# Patient Record
Sex: FEMALE | Race: BLACK OR AFRICAN AMERICAN | Employment: UNEMPLOYED | URBAN - METROPOLITAN AREA
[De-identification: names, ages, dates, MRNs, and addresses within clinical notes are randomized per-mention and may not be internally consistent; named-entity substitution may affect disease eponyms.]

---

## 2017-03-07 ENCOUNTER — HOSPITAL ENCOUNTER (OUTPATIENT)
Age: 57
Setting detail: OBSERVATION
Discharge: HOME | DRG: 460 | End: 2017-03-08
Attending: EMERGENCY MEDICINE | Admitting: INTERNAL MEDICINE
Payer: MEDICAID

## 2017-03-07 ENCOUNTER — APPOINTMENT (OUTPATIENT)
Dept: GENERAL RADIOLOGY | Age: 57
DRG: 460 | End: 2017-03-07
Attending: EMERGENCY MEDICINE
Payer: MEDICAID

## 2017-03-07 DIAGNOSIS — E86.0 DEHYDRATION: ICD-10-CM

## 2017-03-07 DIAGNOSIS — Z94.0 RENAL TRANSPLANT RECIPIENT: ICD-10-CM

## 2017-03-07 DIAGNOSIS — K52.9 GASTROENTERITIS, ACUTE: Primary | ICD-10-CM

## 2017-03-07 PROBLEM — K21.9 GASTROESOPHAGEAL REFLUX DISEASE WITHOUT ESOPHAGITIS: Status: ACTIVE | Noted: 2017-03-07

## 2017-03-07 PROBLEM — N18.30 DM TYPE 2 CAUSING CKD STAGE 3 (HCC): Status: ACTIVE | Noted: 2017-03-07

## 2017-03-07 PROBLEM — E11.22 DM TYPE 2 CAUSING CKD STAGE 3 (HCC): Status: ACTIVE | Noted: 2017-03-07

## 2017-03-07 PROBLEM — F32.A DEPRESSION: Status: ACTIVE | Noted: 2017-03-07

## 2017-03-07 PROBLEM — E78.5 HYPERLIPIDEMIA: Status: ACTIVE | Noted: 2017-03-07

## 2017-03-07 LAB
ALBUMIN SERPL BCP-MCNC: 3.1 G/DL (ref 3.5–5)
ALBUMIN/GLOB SERPL: 0.8 {RATIO} (ref 1.1–2.2)
ALP SERPL-CCNC: 79 U/L (ref 45–117)
ALT SERPL-CCNC: 48 U/L (ref 12–78)
ANION GAP BLD CALC-SCNC: 14 MMOL/L (ref 5–15)
APPEARANCE UR: ABNORMAL
AST SERPL W P-5'-P-CCNC: 23 U/L (ref 15–37)
BACTERIA URNS QL MICRO: ABNORMAL /HPF
BASOPHILS # BLD AUTO: 0 K/UL (ref 0–0.1)
BASOPHILS # BLD: 0 % (ref 0–1)
BILIRUB SERPL-MCNC: 0.5 MG/DL (ref 0.2–1)
BILIRUB UR QL CFM: NEGATIVE
BUN SERPL-MCNC: 22 MG/DL (ref 6–20)
BUN/CREAT SERPL: 12 (ref 12–20)
CALCIUM SERPL-MCNC: 8.8 MG/DL (ref 8.5–10.1)
CHLORIDE SERPL-SCNC: 107 MMOL/L (ref 97–108)
CO2 SERPL-SCNC: 19 MMOL/L (ref 21–32)
COLOR UR: ABNORMAL
CREAT SERPL-MCNC: 1.79 MG/DL (ref 0.55–1.02)
EOSINOPHIL # BLD: 0.3 K/UL (ref 0–0.4)
EOSINOPHIL NFR BLD: 3 % (ref 0–7)
EPITH CASTS URNS QL MICRO: ABNORMAL /LPF
ERYTHROCYTE [DISTWIDTH] IN BLOOD BY AUTOMATED COUNT: 13.5 % (ref 11.5–14.5)
GLOBULIN SER CALC-MCNC: 3.7 G/DL (ref 2–4)
GLUCOSE BLD STRIP.AUTO-MCNC: 184 MG/DL (ref 65–100)
GLUCOSE BLD STRIP.AUTO-MCNC: 230 MG/DL (ref 65–100)
GLUCOSE BLD STRIP.AUTO-MCNC: 336 MG/DL (ref 65–100)
GLUCOSE BLD STRIP.AUTO-MCNC: 378 MG/DL (ref 65–100)
GLUCOSE SERPL-MCNC: 226 MG/DL (ref 65–100)
GLUCOSE UR STRIP.AUTO-MCNC: NEGATIVE MG/DL
HCT VFR BLD AUTO: 37.9 % (ref 35–47)
HGB BLD-MCNC: 12.7 G/DL (ref 11.5–16)
HGB UR QL STRIP: ABNORMAL
KETONES UR QL STRIP.AUTO: ABNORMAL MG/DL
LACTATE SERPL-SCNC: 1.1 MMOL/L (ref 0.4–2)
LEUKOCYTE ESTERASE UR QL STRIP.AUTO: NEGATIVE
LIPASE SERPL-CCNC: 156 U/L (ref 73–393)
LYMPHOCYTES # BLD AUTO: 34 % (ref 12–49)
LYMPHOCYTES # BLD: 3 K/UL (ref 0.8–3.5)
MCH RBC QN AUTO: 29.5 PG (ref 26–34)
MCHC RBC AUTO-ENTMCNC: 33.5 G/DL (ref 30–36.5)
MCV RBC AUTO: 88.1 FL (ref 80–99)
MONOCYTES # BLD: 1.3 K/UL (ref 0–1)
MONOCYTES NFR BLD AUTO: 15 % (ref 5–13)
NEUTS SEG # BLD: 4.3 K/UL (ref 1.8–8)
NEUTS SEG NFR BLD AUTO: 48 % (ref 32–75)
NITRITE UR QL STRIP.AUTO: NEGATIVE
PH UR STRIP: 5.5 [PH] (ref 5–8)
PLATELET # BLD AUTO: 137 K/UL (ref 150–400)
POTASSIUM SERPL-SCNC: 3.7 MMOL/L (ref 3.5–5.1)
PROT SERPL-MCNC: 6.8 G/DL (ref 6.4–8.2)
PROT UR STRIP-MCNC: 100 MG/DL
RBC # BLD AUTO: 4.3 M/UL (ref 3.8–5.2)
RBC #/AREA URNS HPF: ABNORMAL /HPF (ref 0–5)
RBC MORPH BLD: ABNORMAL
SERVICE CMNT-IMP: ABNORMAL
SODIUM SERPL-SCNC: 140 MMOL/L (ref 136–145)
SP GR UR REFRACTOMETRY: 1.02 (ref 1–1.03)
UA: UC IF INDICATED,UAUC: ABNORMAL
UROBILINOGEN UR QL STRIP.AUTO: 0.2 EU/DL (ref 0.2–1)
WBC # BLD AUTO: 8.9 K/UL (ref 3.6–11)
WBC URNS QL MICRO: ABNORMAL /HPF (ref 0–4)

## 2017-03-07 PROCEDURE — 96375 TX/PRO/DX INJ NEW DRUG ADDON: CPT

## 2017-03-07 PROCEDURE — 74011250637 HC RX REV CODE- 250/637: Performed by: INTERNAL MEDICINE

## 2017-03-07 PROCEDURE — 85025 COMPLETE CBC W/AUTO DIFF WBC: CPT | Performed by: EMERGENCY MEDICINE

## 2017-03-07 PROCEDURE — 36415 COLL VENOUS BLD VENIPUNCTURE: CPT | Performed by: EMERGENCY MEDICINE

## 2017-03-07 PROCEDURE — 80053 COMPREHEN METABOLIC PANEL: CPT | Performed by: EMERGENCY MEDICINE

## 2017-03-07 PROCEDURE — 81001 URINALYSIS AUTO W/SCOPE: CPT | Performed by: EMERGENCY MEDICINE

## 2017-03-07 PROCEDURE — 74011250636 HC RX REV CODE- 250/636: Performed by: INTERNAL MEDICINE

## 2017-03-07 PROCEDURE — 65270000029 HC RM PRIVATE

## 2017-03-07 PROCEDURE — 96374 THER/PROPH/DIAG INJ IV PUSH: CPT

## 2017-03-07 PROCEDURE — 82962 GLUCOSE BLOOD TEST: CPT

## 2017-03-07 PROCEDURE — 74011250637 HC RX REV CODE- 250/637: Performed by: EMERGENCY MEDICINE

## 2017-03-07 PROCEDURE — 74011636637 HC RX REV CODE- 636/637: Performed by: INTERNAL MEDICINE

## 2017-03-07 PROCEDURE — 96361 HYDRATE IV INFUSION ADD-ON: CPT

## 2017-03-07 PROCEDURE — 87086 URINE CULTURE/COLONY COUNT: CPT | Performed by: EMERGENCY MEDICINE

## 2017-03-07 PROCEDURE — 99285 EMERGENCY DEPT VISIT HI MDM: CPT

## 2017-03-07 PROCEDURE — 71020 XR CHEST PA LAT: CPT

## 2017-03-07 PROCEDURE — 74011250636 HC RX REV CODE- 250/636: Performed by: EMERGENCY MEDICINE

## 2017-03-07 PROCEDURE — 83605 ASSAY OF LACTIC ACID: CPT | Performed by: EMERGENCY MEDICINE

## 2017-03-07 PROCEDURE — 96372 THER/PROPH/DIAG INJ SC/IM: CPT

## 2017-03-07 PROCEDURE — 83690 ASSAY OF LIPASE: CPT | Performed by: EMERGENCY MEDICINE

## 2017-03-07 PROCEDURE — 99218 HC RM OBSERVATION: CPT

## 2017-03-07 RX ORDER — TACROLIMUS 1 MG/1
3 CAPSULE ORAL 2 TIMES DAILY
Status: DISCONTINUED | OUTPATIENT
Start: 2017-03-07 | End: 2017-03-08 | Stop reason: HOSPADM

## 2017-03-07 RX ORDER — SULFAMETHOXAZOLE AND TRIMETHOPRIM 800; 160 MG/1; MG/1
1 TABLET ORAL
Status: DISCONTINUED | OUTPATIENT
Start: 2017-03-10 | End: 2017-03-08 | Stop reason: HOSPADM

## 2017-03-07 RX ORDER — DEXTROSE 50 % IN WATER (D50W) INTRAVENOUS SYRINGE
12.5-25 AS NEEDED
Status: DISCONTINUED | OUTPATIENT
Start: 2017-03-07 | End: 2017-03-08 | Stop reason: HOSPADM

## 2017-03-07 RX ORDER — PREDNISONE 5 MG/1
7.5 TABLET ORAL DAILY
Status: DISCONTINUED | OUTPATIENT
Start: 2017-03-07 | End: 2017-03-08 | Stop reason: HOSPADM

## 2017-03-07 RX ORDER — ATORVASTATIN CALCIUM 40 MG/1
40 TABLET, FILM COATED ORAL DAILY
COMMUNITY

## 2017-03-07 RX ORDER — HEPARIN SODIUM 5000 [USP'U]/ML
5000 INJECTION, SOLUTION INTRAVENOUS; SUBCUTANEOUS EVERY 8 HOURS
Status: DISCONTINUED | OUTPATIENT
Start: 2017-03-07 | End: 2017-03-08 | Stop reason: HOSPADM

## 2017-03-07 RX ORDER — ACETAMINOPHEN 500 MG
2000 TABLET ORAL DAILY
COMMUNITY

## 2017-03-07 RX ORDER — SODIUM CHLORIDE 9 MG/ML
100 INJECTION, SOLUTION INTRAVENOUS CONTINUOUS
Status: DISCONTINUED | OUTPATIENT
Start: 2017-03-07 | End: 2017-03-08 | Stop reason: HOSPADM

## 2017-03-07 RX ORDER — ATORVASTATIN CALCIUM 10 MG/1
40 TABLET, FILM COATED ORAL DAILY
Status: DISCONTINUED | OUTPATIENT
Start: 2017-03-07 | End: 2017-03-08 | Stop reason: HOSPADM

## 2017-03-07 RX ORDER — INSULIN LISPRO 100 [IU]/ML
20 INJECTION, SOLUTION INTRAVENOUS; SUBCUTANEOUS
Status: DISCONTINUED | OUTPATIENT
Start: 2017-03-08 | End: 2017-03-08 | Stop reason: HOSPADM

## 2017-03-07 RX ORDER — PROCHLORPERAZINE EDISYLATE 5 MG/ML
5 INJECTION INTRAMUSCULAR; INTRAVENOUS
Status: DISCONTINUED | OUTPATIENT
Start: 2017-03-07 | End: 2017-03-08 | Stop reason: HOSPADM

## 2017-03-07 RX ORDER — SERTRALINE HYDROCHLORIDE 50 MG/1
50 TABLET, FILM COATED ORAL DAILY
Status: DISCONTINUED | OUTPATIENT
Start: 2017-03-07 | End: 2017-03-08 | Stop reason: HOSPADM

## 2017-03-07 RX ORDER — MAGNESIUM SULFATE 100 %
4 CRYSTALS MISCELLANEOUS AS NEEDED
Status: DISCONTINUED | OUTPATIENT
Start: 2017-03-07 | End: 2017-03-08 | Stop reason: HOSPADM

## 2017-03-07 RX ORDER — INSULIN LISPRO 100 [IU]/ML
INJECTION, SOLUTION INTRAVENOUS; SUBCUTANEOUS
Status: DISCONTINUED | OUTPATIENT
Start: 2017-03-07 | End: 2017-03-08 | Stop reason: HOSPADM

## 2017-03-07 RX ORDER — ACETAMINOPHEN 325 MG/1
650 TABLET ORAL
Status: COMPLETED | OUTPATIENT
Start: 2017-03-07 | End: 2017-03-07

## 2017-03-07 RX ORDER — INSULIN LISPRO 100 [IU]/ML
22 INJECTION, SOLUTION INTRAVENOUS; SUBCUTANEOUS
Status: DISCONTINUED | OUTPATIENT
Start: 2017-03-08 | End: 2017-03-08 | Stop reason: HOSPADM

## 2017-03-07 RX ORDER — LORAZEPAM 1 MG/1
1 TABLET ORAL
Status: DISCONTINUED | OUTPATIENT
Start: 2017-03-07 | End: 2017-03-08 | Stop reason: HOSPADM

## 2017-03-07 RX ORDER — ACETAMINOPHEN 325 MG/1
650 TABLET ORAL
Status: DISCONTINUED | OUTPATIENT
Start: 2017-03-07 | End: 2017-03-08 | Stop reason: HOSPADM

## 2017-03-07 RX ORDER — NALOXONE HYDROCHLORIDE 0.4 MG/ML
0.4 INJECTION, SOLUTION INTRAMUSCULAR; INTRAVENOUS; SUBCUTANEOUS AS NEEDED
Status: DISCONTINUED | OUTPATIENT
Start: 2017-03-07 | End: 2017-03-08 | Stop reason: HOSPADM

## 2017-03-07 RX ORDER — SODIUM CHLORIDE 0.9 % (FLUSH) 0.9 %
5-10 SYRINGE (ML) INJECTION EVERY 8 HOURS
Status: DISCONTINUED | OUTPATIENT
Start: 2017-03-07 | End: 2017-03-08 | Stop reason: HOSPADM

## 2017-03-07 RX ORDER — DIPHENHYDRAMINE HYDROCHLORIDE 50 MG/ML
12.5 INJECTION, SOLUTION INTRAMUSCULAR; INTRAVENOUS
Status: DISCONTINUED | OUTPATIENT
Start: 2017-03-07 | End: 2017-03-08 | Stop reason: HOSPADM

## 2017-03-07 RX ORDER — INSULIN LISPRO 100 [IU]/ML
20 INJECTION, SOLUTION INTRAVENOUS; SUBCUTANEOUS
COMMUNITY

## 2017-03-07 RX ORDER — INSULIN LISPRO 100 [IU]/ML
16 INJECTION, SOLUTION INTRAVENOUS; SUBCUTANEOUS
COMMUNITY

## 2017-03-07 RX ORDER — ONDANSETRON 2 MG/ML
8 INJECTION INTRAMUSCULAR; INTRAVENOUS
Status: COMPLETED | OUTPATIENT
Start: 2017-03-07 | End: 2017-03-07

## 2017-03-07 RX ORDER — ALBUTEROL SULFATE 90 UG/1
2 AEROSOL, METERED RESPIRATORY (INHALATION)
COMMUNITY

## 2017-03-07 RX ORDER — INSULIN LISPRO 100 [IU]/ML
16 INJECTION, SOLUTION INTRAVENOUS; SUBCUTANEOUS
Status: DISCONTINUED | OUTPATIENT
Start: 2017-03-08 | End: 2017-03-08 | Stop reason: HOSPADM

## 2017-03-07 RX ORDER — INSULIN LISPRO 100 [IU]/ML
22 INJECTION, SOLUTION INTRAVENOUS; SUBCUTANEOUS
COMMUNITY

## 2017-03-07 RX ORDER — MORPHINE SULFATE 2 MG/ML
1 INJECTION, SOLUTION INTRAMUSCULAR; INTRAVENOUS
Status: DISCONTINUED | OUTPATIENT
Start: 2017-03-07 | End: 2017-03-08 | Stop reason: HOSPADM

## 2017-03-07 RX ORDER — ASPIRIN 81 MG/1
81 TABLET ORAL DAILY
Status: DISCONTINUED | OUTPATIENT
Start: 2017-03-07 | End: 2017-03-08 | Stop reason: HOSPADM

## 2017-03-07 RX ORDER — SODIUM CHLORIDE 0.9 % (FLUSH) 0.9 %
5-10 SYRINGE (ML) INJECTION AS NEEDED
Status: DISCONTINUED | OUTPATIENT
Start: 2017-03-07 | End: 2017-03-08 | Stop reason: HOSPADM

## 2017-03-07 RX ADMIN — HEPARIN SODIUM 5000 UNITS: 5000 INJECTION, SOLUTION INTRAVENOUS; SUBCUTANEOUS at 17:43

## 2017-03-07 RX ADMIN — INSULIN LISPRO 2 UNITS: 100 INJECTION, SOLUTION INTRAVENOUS; SUBCUTANEOUS at 07:26

## 2017-03-07 RX ADMIN — ONDANSETRON 8 MG: 2 INJECTION INTRAMUSCULAR; INTRAVENOUS at 01:03

## 2017-03-07 RX ADMIN — INSULIN LISPRO 3 UNITS: 100 INJECTION, SOLUTION INTRAVENOUS; SUBCUTANEOUS at 11:34

## 2017-03-07 RX ADMIN — SODIUM CHLORIDE 100 ML/HR: 900 INJECTION, SOLUTION INTRAVENOUS at 03:10

## 2017-03-07 RX ADMIN — INSULIN LISPRO 15 UNITS: 100 INJECTION, SOLUTION INTRAVENOUS; SUBCUTANEOUS at 16:15

## 2017-03-07 RX ADMIN — SODIUM CHLORIDE 1000 ML: 900 INJECTION, SOLUTION INTRAVENOUS at 00:56

## 2017-03-07 RX ADMIN — INSULIN LISPRO 4 UNITS: 100 INJECTION, SOLUTION INTRAVENOUS; SUBCUTANEOUS at 22:42

## 2017-03-07 RX ADMIN — TACROLIMUS 3 MG: 1 CAPSULE ORAL at 22:42

## 2017-03-07 RX ADMIN — ASPIRIN 81 MG: 81 TABLET, COATED ORAL at 17:42

## 2017-03-07 RX ADMIN — ACETAMINOPHEN 650 MG: 325 TABLET ORAL at 11:35

## 2017-03-07 RX ADMIN — PREDNISONE 7.5 MG: 5 TABLET ORAL at 11:34

## 2017-03-07 RX ADMIN — ACETAMINOPHEN 650 MG: 325 TABLET ORAL at 01:03

## 2017-03-07 RX ADMIN — HEPARIN SODIUM 5000 UNITS: 5000 INJECTION, SOLUTION INTRAVENOUS; SUBCUTANEOUS at 11:34

## 2017-03-07 RX ADMIN — HEPARIN SODIUM 5000 UNITS: 5000 INJECTION, SOLUTION INTRAVENOUS; SUBCUTANEOUS at 03:10

## 2017-03-07 RX ADMIN — SODIUM CHLORIDE 100 ML/HR: 900 INJECTION, SOLUTION INTRAVENOUS at 21:04

## 2017-03-07 RX ADMIN — ATORVASTATIN CALCIUM 40 MG: 10 TABLET, FILM COATED ORAL at 17:42

## 2017-03-07 RX ADMIN — TACROLIMUS 3 MG: 1 CAPSULE ORAL at 11:35

## 2017-03-07 RX ADMIN — Medication 10 ML: at 21:04

## 2017-03-07 RX ADMIN — SODIUM CHLORIDE 100 ML/HR: 900 INJECTION, SOLUTION INTRAVENOUS at 12:35

## 2017-03-07 RX ADMIN — SERTRALINE 50 MG: 50 TABLET, FILM COATED ORAL at 17:42

## 2017-03-07 RX ADMIN — HUMAN INSULIN 24 UNITS: 100 INJECTION, SUSPENSION SUBCUTANEOUS at 22:41

## 2017-03-07 RX ADMIN — PROCHLORPERAZINE EDISYLATE 5 MG: 5 INJECTION INTRAMUSCULAR; INTRAVENOUS at 21:03

## 2017-03-07 NOTE — ED NOTES
Bedside and Verbal shift change report given to Santhosh Esteves RN (oncoming nurse) by Taya Jaimes RN (offgoing nurse). Report included the following information SBAR, Kardex, ED Summary, Procedure Summary, Intake/Output, MAR, Recent Results and Med Rec Status.

## 2017-03-07 NOTE — ED NOTES
TRANSFER - OUT REPORT:    Verbal report given to [place] Jonatan (name) on [address] [facility]  being transferred to [facility](unit) for routine progression of care       Report consisted of patients Situation, Background, Assessment and   Recommendations(SBAR). Information from the following report(s) SBAR, Kardex, ED Summary and Recent Results was reviewed with the receiving nurse. Lines:   Peripheral IV 03/07/17 Left Forearm (Active)   Site Assessment Clean, dry, & intact 3/7/2017 12:46 AM   Phlebitis Assessment 0 3/7/2017 12:46 AM   Infiltration Assessment 0 3/7/2017 12:46 AM   Dressing Status Clean, dry, & intact 3/7/2017 12:46 AM   Dressing Type Transparent 3/7/2017 12:46 AM   Hub Color/Line Status Pink;Flushed;Patent 3/7/2017 12:46 AM        Opportunity for questions and clarification was provided.       Patient transported with:   Acucela

## 2017-03-07 NOTE — IP AVS SNAPSHOT
Current Discharge Medication List  
  
Take these medications at their scheduled times Dose & Instructions Dispensing Information Comments Morning Noon Evening Bedtime  
 amLODIPine 10 mg tablet Commonly known as:  Pedro Doyne Your next dose is: Today, Tomorrow Other:  ____________ Dose:  10 mg Take 10 mg by mouth daily. Refills:  0  
     
   
   
   
  
 aspirin delayed-release 81 mg tablet Your next dose is: Today, Tomorrow Other:  ____________ Dose:  81 mg Take 81 mg by mouth daily. Refills:  0  
     
   
   
   
  
 atenolol 50 mg tablet Commonly known as:  TENORMIN Your next dose is: Today, Tomorrow Other:  ____________ Dose:  50 mg Take 50 mg by mouth daily. Refills:  0  
     
   
   
   
  
 atorvastatin 40 mg tablet Commonly known as:  LIPITOR Your next dose is: Today, Tomorrow Other:  ____________ Dose:  40 mg Take 40 mg by mouth daily. Refills:  0 BACTRIM -800 mg per tablet Generic drug:  trimethoprim-sulfamethoxazole Your next dose is: Today, Tomorrow Other:  ____________ Dose:  1 Tab Take 1 Tab by mouth every Monday and Friday. In the morning Refills:  0 Cholecalciferol (Vitamin D3) 2,000 unit Cap capsule Commonly known as:  VITAMIN D3 Your next dose is: Today, Tomorrow Other:  ____________ Dose:  2000 Units Take 2,000 Units by mouth daily. Refills:  0 FERREX 150 PO Your next dose is: Today, Tomorrow Other:  ____________ Dose:  1 Cap Take 1 Cap by mouth daily. Refills:  0  
     
   
   
   
  
 * insulin lispro 100 unit/mL injection Commonly known as:  HUMALOG Your next dose is: Today, Tomorrow Other:  ____________ Dose:  22 Units 22 Units by SubCUTAneous route Daily (before breakfast). Refills:  0  
     
   
   
   
  
 * insulin lispro 100 unit/mL injection Commonly known as:  HUMALOG Your next dose is: Today, Tomorrow Other:  ____________ Dose:  20 Units 20 Units by SubCUTAneous route Daily (before lunch). Refills:  0  
     
   
   
   
  
 * insulin lispro 100 unit/mL injection Commonly known as:  HUMALOG Your next dose is: Today, Tomorrow Other:  ____________ Dose:  16 Units 16 Units by SubCUTAneous route Daily (before dinner). Refills:  0  
     
   
   
   
  
 * insulin  unit/mL injection Commonly known as:  [place] Beets Your next dose is: Today, Tomorrow Other:  ____________ Dose:  22 Units 22 Units by SubCUTAneous route daily. Refills:  0  
     
   
   
   
  
 * insulin  unit/mL injection Commonly known as:  [place] Beets Your next dose is: Today, Tomorrow Other:  ____________ Dose:  24 Units 24 Units by SubCUTAneous route nightly. Refills:  0  
     
   
   
   
  
 potassium chloride 20 mEq tablet Commonly known as:  K-DUR, KLOR-CON Your next dose is: Today, Tomorrow Other:  ____________ Dose:  20 mEq Take 20 mEq by mouth two (2) times a day. Refills:  0  
     
   
   
   
  
 predniSONE 5 mg tablet Commonly known as:  [place] Your next dose is: Today, Tomorrow Other:  ____________ Dose:  7.5 mg Take 7.5 mg by mouth daily. Refills:  0 PROGRAF 1 mg capsule Generic drug:  tacrolimus Your next dose is: Today, Tomorrow Other:  ____________ Dose:  3 mg Take 3 mg by mouth two (2) times a day. Refills:  0  
     
   
   
   
  
 sertraline 50 mg tablet Commonly known as:  ZOLOFT Your next dose is: Today, Tomorrow Other:  ____________ Dose:  50 mg Take 50 mg by mouth daily. Refills:  0  
     
   
   
   
  
 ZANTAC 150 mg tablet Generic drug:  raNITIdine Your next dose is: Today, Tomorrow Other:  ____________ Dose:  150 mg Take 150 mg by mouth two (2) times a day. Refills:  0  
     
   
   
   
  
 * Notice: This list has 5 medication(s) that are the same as other medications prescribed for you. Read the directions carefully, and ask your doctor or other care provider to review them with you. Take these medications as needed Dose & Instructions Dispensing Information Comments Morning Noon Evening Bedtime  
 albuterol 90 mcg/actuation inhaler Commonly known as:  PROVENTIL HFA, VENTOLIN HFA, PROAIR HFA Your next dose is: Today, Tomorrow Other:  ____________ Dose:  2 Puff Take 2 Puffs by inhalation every four (4) hours as needed for Wheezing. Refills:  0

## 2017-03-07 NOTE — CONSULTS
[address] [place] [address]  YOB: 1960     Assessment & Plan:   1. CADKT 1997  · Cr 1.79 mg  · On pred 7.5 and FK 3 MG bid  2. Acute Gastroenteritis  3. HTN  4. DM  5. GERD  6. Dyslipidemia  7. Depression          Subjective:   CHIEF COMPLAIN:diarrhea  HPI:  Ms. Alfredito Escalante is a 60 y.o. female who is being admitted for acute GE. I called [facility]. She is on 2 : FK 3 BID, Pred 7.5 mg daily  She is on Bactrim  MF Since 2008. She has missed some meds due to GI Illness. Ms. Alfredito Escalante presented to our Emergency Department today complaining of nausea, vomiting as well as watery diarrhea. Associated with a cramping abdominal discomfort. She also had a low grade fever of 100.2 to 100.4. Symptoms have persistent and she was seen by her PCP and started on Amoxil. She is immunosuppressed . No more diarrhea. No cp/sob/fever now,         Review of Systems  A comprehensive review of systems was negative except for that written in the HPI. Past Medical History:   Diagnosis Date    CKD (chronic kidney disease) stage 3, GFR 30-59 ml/min 5/31/2013    Diabetes mellitus ([facility] [address]     HTN (hypertension)     S/p cadaver renal transplant     Spleen enlarged       Past Surgical History:   Procedure Laterality Date    HX RENAL TRANSPLANT  1997       Social History     Social History    Marital status:      Spouse name: N/A    Number of children: N/A    Years of education: N/A     Occupational History    Not on file. Social History Main Topics    Smoking status: Never Smoker    Smokeless tobacco: Not on file    Alcohol use No    Drug use: No    Sexual activity: Not on file     Other Topics Concern    Not on file     Social History Narrative      Family History   Problem Relation Age of Onset    Diabetes Other     Hypertension Other       Prior to Admission medications    Medication Sig Start Date End Date Taking?  Authorizing Provider   insulin NPH (NOVOLIN N, HUMULIN N) 100 unit/mL injection 22 Units by SubCUTAneous route daily. Yes Historical Provider   insulin lispro (HUMALOG) 100 unit/mL injection 22 Units by SubCUTAneous route Daily (before breakfast). Yes Historical Provider   insulin lispro (HUMALOG) 100 unit/mL injection 20 Units by SubCUTAneous route Daily (before lunch). Yes Historical Provider   insulin lispro (HUMALOG) 100 unit/mL injection 16 Units by SubCUTAneous route Daily (before dinner). Yes Historical Provider   albuterol (PROVENTIL HFA, VENTOLIN HFA, PROAIR HFA) 90 mcg/actuation inhaler Take 2 Puffs by inhalation every four (4) hours as needed for Wheezing. Yes Historical Provider   Cholecalciferol, Vitamin D3, (VITAMIN D3) 2,000 unit cap capsule Take 2,000 Units by mouth daily. Yes Historical Provider   atorvastatin (LIPITOR) 40 mg tablet Take 40 mg by mouth daily. Yes Historical Provider   insulin NPH (NOVOLIN N, HUMULIN N) 100 unit/mL injection 24 Units by SubCUTAneous route nightly. Yes Historical Provider   tacrolimus (PROGRAF) 1 mg capsule Take 3 mg by mouth two (2) times a day. Yes Historical Provider   predniSONE (DELTASONE) 5 mg tablet Take 7.5 mg by mouth daily. Yes Historical Provider   trimethoprim-sulfamethoxazole (BACTRIM DS) 160-800 mg per tablet Take 1 Tab by mouth every Monday and Friday. In the morning   Yes Historical Provider   amLODIPine (NORVASC) 10 mg tablet Take 10 mg by mouth daily. Yes Historical Provider   sertraline (ZOLOFT) 50 mg tablet Take 50 mg by mouth daily. Yes Historical Provider   IRON POLYSACCHARIDES COMPLEX (FERREX 150 PO) Take 1 Cap by mouth daily. Yes Historical Provider   aspirin delayed-release 81 mg tablet Take 81 mg by mouth daily. Yes Historical Provider   ranitidine (ZANTAC) 150 mg tablet Take 150 mg by mouth two (2) times a day. Yes Historical Provider   potassium chloride (K-DUR, KLOR-CON) 20 mEq tablet Take 20 mEq by mouth two (2) times a day.    Yes Historical Provider   atenolol (TENORMIN) 50 mg tablet Take 50 mg by mouth daily. Yes Historical Provider     Allergies   Allergen Reactions    Doxycycline Rash    Nexium [Esomeprazole Magnesium] Unknown (comments)     Patient thinks that she is allergic to nexium. States \"purple pill\"     Zosyn [Piperacillin-Tazobactam] Rash       Objective:     Vitals:  Blood pressure 125/78, pulse 90, temperature 98.9 °F (37.2 °C), resp. rate 20, height 5' 6\" (1.676 m), weight 92.1 kg (203 lb), SpO2 93 %. Temp (24hrs), Av.3 °F (37.4 °C), Min:98.9 °F (37.2 °C), Max:99.7 °F (37.6 °C)      Intake and Output:          Physical Exam:                Patient is intubated:  no    Physical Examination:   GENERAL ASSESSMENT: cachetic  SKIN: normal color, no lesions  HEAD: normocephalic  EYES: normal eyes  NECK: normal  CHEST: normal air exchange, no rales, no rhonchi, no wheezes, respiratory effort normal with no retractions  HEART: regular rate and rhythm, normal S1/S2, no murmurs  ABDOMEN: soft, non-distended, no masses, no hepatosplenomegaly  :Ortiz: no  EXTREMITY: normal and symmetric movement, normal range of motion, no joint swelling  NEURO: gross motor exam normal by observation      ECG/rhythm[de-identified] Rev:yes  Xray/CT/US/MRI REV:yes  Data Review   Recent Results (from the past 72 hour(s))   CBC WITH AUTOMATED DIFF    Collection Time: 17 12:50 AM   Result Value Ref Range    WBC 8.9 3.6 - 11.0 K/uL    RBC 4.30 3.80 - 5.20 M/uL    HGB 12.7 11.5 - 16.0 g/dL    HCT 37.9 35.0 - 47.0 %    MCV 88.1 80.0 - 99.0 FL    MCH 29.5 26.0 - 34.0 PG    MCHC 33.5 30.0 - 36.5 g/dL    RDW 13.5 11.5 - 14.5 %    PLATELET 785 (L) 686 - 400 K/uL    NEUTROPHILS 48 32 - 75 %    LYMPHOCYTES 34 12 - 49 %    MONOCYTES 15 (H) 5 - 13 %    EOSINOPHILS 3 0 - 7 %    BASOPHILS 0 0 - 1 %    ABS. NEUTROPHILS 4.3 1.8 - 8.0 K/UL    ABS. LYMPHOCYTES 3.0 0.8 - 3.5 K/UL    ABS. MONOCYTES 1.3 (H) 0.0 - 1.0 K/UL    ABS. EOSINOPHILS 0.3 0.0 - 0.4 K/UL    ABS.  BASOPHILS 0.0 0.0 - 0.1 K/UL    RBC COMMENTS NORMOCYTIC, NORMOCHROMIC     METABOLIC PANEL, COMPREHENSIVE    Collection Time: 03/07/17 12:50 AM   Result Value Ref Range    Sodium 140 136 - 145 mmol/L    Potassium 3.7 3.5 - 5.1 mmol/L    Chloride 107 97 - 108 mmol/L    CO2 19 (L) 21 - 32 mmol/L    Anion gap 14 5 - 15 mmol/L    Glucose 226 (H) 65 - 100 mg/dL    BUN 22 (H) 6 - 20 MG/DL    Creatinine 1.79 (H) 0.55 - 1.02 MG/DL    BUN/Creatinine ratio 12 12 - 20      GFR est AA 35 (L) >60 ml/min/1.73m2    GFR est non-AA 29 (L) >60 ml/min/1.73m2    Calcium 8.8 8.5 - 10.1 MG/DL    Bilirubin, total 0.5 0.2 - 1.0 MG/DL    ALT (SGPT) 48 12 - 78 U/L    AST (SGOT) 23 15 - 37 U/L    Alk.  phosphatase 79 45 - 117 U/L    Protein, total 6.8 6.4 - 8.2 g/dL    Albumin 3.1 (L) 3.5 - 5.0 g/dL    Globulin 3.7 2.0 - 4.0 g/dL    A-G Ratio 0.8 (L) 1.1 - 2.2     LIPASE    Collection Time: 03/07/17 12:50 AM   Result Value Ref Range    Lipase 156 73 - 393 U/L   LACTIC ACID, PLASMA    Collection Time: 03/07/17 12:50 AM   Result Value Ref Range    Lactic acid 1.1 0.4 - 2.0 MMOL/L   URINALYSIS W/ REFLEX CULTURE    Collection Time: 03/07/17  1:51 AM   Result Value Ref Range    Color YELLOW/STRAW      Appearance CLOUDY (A) CLEAR      Specific gravity 1.018 1.003 - 1.030      pH (UA) 5.5 5.0 - 8.0      Protein 100 (A) NEG mg/dL    Glucose NEGATIVE  NEG mg/dL    Ketone TRACE (A) NEG mg/dL    Blood SMALL (A) NEG      Urobilinogen 0.2 0.2 - 1.0 EU/dL    Nitrites NEGATIVE  NEG      Leukocyte Esterase NEGATIVE  NEG      WBC 0-4 0 - 4 /hpf    RBC 0-5 0 - 5 /hpf    Epithelial cells MANY (A) FEW /lpf    Bacteria 1+ (A) NEG /hpf    UA:UC IF INDICATED URINE CULTURE ORDERED (A) CNI     BILIRUBIN, CONFIRM    Collection Time: 03/07/17  1:51 AM   Result Value Ref Range    Bilirubin UA, confirm NEGATIVE  NEG     GLUCOSE, POC    Collection Time: 03/07/17  7:16 AM   Result Value Ref Range    Glucose (POC) 184 (H) 65 - 100 mg/dL    Performed by Yolanda Smith with:    Patient and Attending/Consulting  Thank you so much to allow us to participate in this patient's care. We will follow.  : Deidra Almeida MD  3/7/2017      [facility]:  [email or web address]  [place]ul[place] office:  [address], [address] [place] [address],[address]  [address]  Phone: [phone]  Fax :     [phone]    [place] office:  [address] [facility], [address] [place] [address]  Phone - [phone]  Fax - [phone]

## 2017-03-07 NOTE — IP AVS SNAPSHOT
Asadjaved Zepeda 
 
 
 [address] [place] [address] 
[phone] Patient: William Ramirez MRN: [id number] LWJ:6/4/[birth year]73 You are allergic to the following Allergen Reactions Doxycycline Rash Nexium (Esomeprazole Magnesium) Unknown (comments) Patient thinks that she is allergic to nexium. States \"purple pill\" Zosyn (Piperacillin-Tazobactam) Rash Recent Documentation Height Weight BMI OB Status Smoking Status 1.676 m 92.1 kg 32.77 kg/m2 Postmenopausal Never Smoker Unresulted Labs Order Current Status TACROLIMUS, WHOLE BLOOD In process Emergency Contacts Name Discharge Info Relation Home Work Mobile Candace Downs  Other Relative [6] [phone] About your hospitalization You were admitted on:  March 7, 2017 You last received care in the:  [facility]  MED SURG 2 You were discharged on:  March 8, 2017 Unit phone number:  [phone] Why you were hospitalized Your primary diagnosis was:  Not on File Your diagnoses also included:  Acute Gastroenteritis, Nausea And Vomiting, Htn (Hypertension), Benign, Dm Type 2 Causing Ckd Stage 3 (Hcc), S/P Cadaver Renal Transplant, Ckd (Chronic Kidney Disease) Stage 3, Gfr 30-59 Ml/Min, Gastroesophageal Reflux Disease Without Esophagitis, Hyperlipidemia, Depression Providers Seen During Your Hospitalizations Provider Role Specialty Primary office phone Serge Patel MD Attending Provider Emergency Medicine [phone] Breann Faustin MD Attending Provider Internal Medicine [phone] Anne Marie Segura MD Attending Provider Internal Medicine [phone] Your Primary Care Physician (PCP) Primary Care Physician Office Phone Office Fax Everardo Linares [phone] Follow-up Information Follow up With Details Comments Contact Info LAMINE Gregg [address] 
[phone] Current Discharge Medication List  
  
CONTINUE these medications which have NOT CHANGED Dose & Instructions Dispensing Information Comments Morning Noon Evening Bedtime  
 albuterol 90 mcg/actuation inhaler Commonly known as:  PROVENTIL HFA, VENTOLIN HFA, PROAIR HFA Your next dose is: Today, Tomorrow Other:  _________ Dose:  2 Puff Take 2 Puffs by inhalation every four (4) hours as needed for Wheezing. Refills:  0  
     
   
   
   
  
 amLODIPine 10 mg tablet Commonly known as:  Aparicio Inks Your next dose is: Today, Tomorrow Other:  _________ Dose:  10 mg Take 10 mg by mouth daily. Refills:  0  
     
   
   
   
  
 aspirin delayed-release 81 mg tablet Your next dose is: Today, Tomorrow Other:  _________ Dose:  81 mg Take 81 mg by mouth daily. Refills:  0  
     
   
   
   
  
 atenolol 50 mg tablet Commonly known as:  TENORMIN Your next dose is: Today, Tomorrow Other:  _________ Dose:  50 mg Take 50 mg by mouth daily. Refills:  0  
     
   
   
   
  
 atorvastatin 40 mg tablet Commonly known as:  LIPITOR Your next dose is: Today, Tomorrow Other:  _________ Dose:  40 mg Take 40 mg by mouth daily. Refills:  0 BACTRIM -800 mg per tablet Generic drug:  trimethoprim-sulfamethoxazole Your next dose is: Today, Tomorrow Other:  _________ Dose:  1 Tab Take 1 Tab by mouth every Monday and Friday. In the morning Refills:  0 Cholecalciferol (Vitamin D3) 2,000 unit Cap capsule Commonly known as:  VITAMIN D3 Your next dose is: Today, Tomorrow Other:  _________ Dose:  2000 Units Take 2,000 Units by mouth daily. Refills:  0  FERREX 150 PO  
   
 Your next dose is: Today, Tomorrow Other:  _________ Dose:  1 Cap Take 1 Cap by mouth daily. Refills:  0  
     
   
   
   
  
 * insulin lispro 100 unit/mL injection Commonly known as:  HUMALOG Your next dose is: Today, Tomorrow Other:  _________ Dose:  22 Units 22 Units by SubCUTAneous route Daily (before breakfast). Refills:  0  
     
   
   
   
  
 * insulin lispro 100 unit/mL injection Commonly known as:  HUMALOG Your next dose is: Today, Tomorrow Other:  _________ Dose:  20 Units 20 Units by SubCUTAneous route Daily (before lunch). Refills:  0  
     
   
   
   
  
 * insulin lispro 100 unit/mL injection Commonly known as:  HUMALOG Your next dose is: Today, Tomorrow Other:  _________ Dose:  16 Units 16 Units by SubCUTAneous route Daily (before dinner). Refills:  0  
     
   
   
   
  
 * insulin  unit/mL injection Commonly known as:  Angela Carrel Your next dose is: Today, Tomorrow Other:  _________ Dose:  22 Units 22 Units by SubCUTAneous route daily. Refills:  0  
     
   
   
   
  
 * insulin  unit/mL injection Commonly known as:  Angela Carrel Your next dose is: Today, Tomorrow Other:  _________ Dose:  24 Units 24 Units by SubCUTAneous route nightly. Refills:  0  
     
   
   
   
  
 potassium chloride 20 mEq tablet Commonly known as:  K-DUR, KLOR-CON Your next dose is: Today, Tomorrow Other:  _________ Dose:  20 mEq Take 20 mEq by mouth two (2) times a day. Refills:  0  
     
   
   
   
  
 predniSONE 5 mg tablet Commonly known as:  Gifty Ada Your next dose is: Today, Tomorrow Other:  _________ Dose:  7.5 mg Take 7.5 mg by mouth daily. Refills:  0 PROGRAF 1 mg capsule Generic drug:  tacrolimus Your next dose is: Today, Tomorrow Other:  _________ Dose:  3 mg Take 3 mg by mouth two (2) times a day. Refills:  0  
     
   
   
   
  
 sertraline 50 mg tablet Commonly known as:  ZOLOFT Your next dose is: Today, Tomorrow Other:  _________ Dose:  50 mg Take 50 mg by mouth daily. Refills:  0  
     
   
   
   
  
 ZANTAC 150 mg tablet Generic drug:  raNITIdine Your next dose is: Today, Tomorrow Other:  _________ Dose:  150 mg Take 150 mg by mouth two (2) times a day. Refills:  0  
     
   
   
   
  
 * Notice: This list has 5 medication(s) that are the same as other medications prescribed for you. Read the directions carefully, and ask your doctor or other care provider to review them with you. Discharge Instructions HOSPITALIST DISCHARGE INSTRUCTIONS 
NAME: Sushma Chapa :  1960 MRN:  [id number] Date/Time:  3/8/2017 12:35 PM 
 
ADMIT DATE: 3/7/2017 DISCHARGE DATE: 3/8/2017 ADMITTING DIAGNOSIS: 
Viral gastroenteritis DISCHARGE DIAGNOSIS: 
As above MEDICATIONS: 
 
· It is important that you take the medication exactly as they are prescribed. · Keep your medication in the bottles provided by the pharmacist and keep a list of the medication names, dosages, and times to be taken in your wallet. · Do not take other medications without consulting your doctor. Pain Management: per above medications What to do at Home: 1. Check your blood pressure at home twice a day. Call your doctor for blood pressure greater than 150/90 or lower than 110/55 or if you have dizziness or lightheadedness. 2.  Check you blood sugar twice a day. Call your doctor for blood sugar persistently greater than 200 or lower than 80. Recommended diet:  Diabetic Diet Recommended activity: Activity as tolerated If you experience any of the following symptoms then please call your primary care physician or return to the emergency room if you cannot get hold of your doctor: 
Fever, chills, nausea, vomiting, diarrhea, change in mentation, falling, bleeding, shortness of breath Follow Up: 
Dr. Tonny Quarles, NP  you are to call and set up an appointment to see them in 1 week. Information obtained by : 
I understand that if any problems occur once I am at home I am to contact my physician. I understand and acknowledge receipt of the instructions indicated above. Physician's or R.N.'s Signature                                                                  Date/Time Patient or Representative Signature                                                          Date/Time Discharge Orders None [facility] Announcement We are excited to announce that we are making your provider's discharge notes available to you in Sova. You will see these notes when they are completed and signed by the physician that discharged you from your recent hospital stay. If you have any questions or concerns about any information you see in Sova, please call the Health Information Department where you were seen or reach out to your Primary Care Provider for more information about your plan of care. Introducing [facility]! Anika Reynolds introduces Sova patient portal. Now you can access parts of your medical record, email your doctor's office, and request medication refills online. 1. In your internet browser, go to [email or web address] 2. Click on the First Time User? Click Here link in the Sign In box. You will see the New Member Sign Up page. 3. Enter your RollS[employer] Access Code exactly as it appears below. You will not need to use this code after youve completed the sign-up process. If you do not sign up before the expiration date, you must request a new code. · [employer] Access Code: [email or web address] Expires: 6/5/2017  1:25 AM 
 
4. Enter the last four digits of your Social Security Number (xxxx) and Date of Birth (mm/dd/yyyy) as indicated and click Submit. You will be taken to the next sign-up page. 5. Create a RollSale ID. This will be your [employer]Sale login ID and cannot be changed, so think of one that is secure and easy to remember. 6. Create a RollSale password. You can change your password at any time. 7. Enter your Password Reset Question and Answer. This can be used at a later time if you forget your password. 8. Enter your e-mail address. You will receive e-mail notification when new information is available in [address] 9. Click Sign Up. You can now view and download portions of your medical record. 10. Click the Download Summary menu link to download a portable copy of your medical information. If you have questions, please visit the Frequently Asked Questions section of the [employer] website. Remember, [employer] is NOT to be used for urgent needs. For medical emergencies, dial 911. Now available from your iPhone and Android! General Information Please provide this summary of care documentation to your next provider. Patient Signature:  ____________________________________________________________ Date:  ____________________________________________________________  
  
Nuvia Wade Provider Signature:  ____________________________________________________________ Date:  ____________________________________________________________

## 2017-03-07 NOTE — PROGRESS NOTES
3/7/2017   CARE MANAGEMENT NOTE:  CM is following pt in the ER for initial discharge planning. EMR reviewed. CM met with pt who was her own historian for this needs assessment. Reportedly, pt resides with her parents in a one story home located in [place], [place].  PTA, pt was ambulatory, and is indepn with ADLs. She is able to drive although she uses Medicaid transportation. Pt has RX drug coverage and uses [facility] in [facility]. She does not have home healthcare nor DME for home use. PCP is Hannah Spence N.P.  Plan is to return home when medically stable. She does not anticipate any post discharge needs at this time.   Alonzo

## 2017-03-07 NOTE — PROGRESS NOTES
Tima Mohr nisreen [address]  [address] [facility], [place], [address] [place] [address]  [phone]      Medical Progress Note      NAME: [place]   :  1960  MRM:  [id number]    Date/Time: 3/7/2017  4:53 PM         Subjective:     Chief Complaint:  I feel better    Pt reports improvement of her diarrhea. No bm since yesterday morning          Objective:       Vitals:          Last 24hrs VS reviewed since prior progress note.  Most recent are:    Visit Vitals    /74 (BP 1 Location: Right arm, BP Patient Position: At rest)    Pulse 82    Temp 98.6 °F (37 °C)    Resp 18    Ht 5' 6\" (1.676 m)    Wt 92.1 kg (203 lb)    SpO2 95%    BMI 32.77 kg/m2     SpO2 Readings from Last 6 Encounters:   17 95%   10/17/15 97%   10/16/13 98%   13 95%          Intake/Output Summary (Last 24 hours) at 17 1653  Last data filed at 17 1651   Gross per 24 hour   Intake                0 ml   Output              400 ml   Net             -400 ml          Exam:     Physical Exam:    Gen:  Well-developed, well-nourished, in no acute distress  HEENT:  Pink conjunctivae, PERRL, hearing intact to voice, moist mucous membranes  Neck:  Supple, without masses, thyroid non-tender  Resp:  No accessory muscle use, clear breath sounds without wheezes rales or rhonchi  Card:  No murmurs, normal S1, S2 without thrills, bruits or peripheral edema  Abd:  Soft, non-tender, non-distended, normoactive bowel sounds are present  Musc:  No cyanosis or clubbing  Skin:  No rashes or ulcers, skin turgor is good  Neuro:  Cranial nerves 3-12 are grossly intact,  strength is 5/5 bilaterally and dorsi / plantarflexion is 5/5 bilaterally, follows commands appropriately  Psych:  Good insight, oriented to person, place and time, alert       Medications Reviewed: (see below)    Lab Data Reviewed: (see below)    ______________________________________________________________________    Medications:     Current Facility-Administered Medications   Medication Dose Route Frequency    sodium chloride (NS) flush 5-10 mL  5-10 mL IntraVENous Q8H    sodium chloride (NS) flush 5-10 mL  5-10 mL IntraVENous PRN    acetaminophen (TYLENOL) tablet 650 mg  650 mg Oral Q4H PRN    morphine injection 1 mg  1 mg IntraVENous Q4H PRN    naloxone (NARCAN) injection 0.4 mg  0.4 mg IntraVENous PRN    diphenhydrAMINE (BENADRYL) injection 12.5 mg  12.5 mg IntraVENous Q4H PRN    prochlorperazine (COMPAZINE) injection 5 mg  5 mg IntraVENous Q8H PRN    LORazepam (ATIVAN) tablet 1 mg  1 mg Oral BID PRN    0.9% sodium chloride infusion  100 mL/hr IntraVENous CONTINUOUS    heparin (porcine) injection 5,000 Units  5,000 Units SubCUTAneous Q8H    insulin lispro (HUMALOG) injection   SubCUTAneous AC&HS    glucose chewable tablet 16 g  4 Tab Oral PRN    dextrose (D50W) injection syrg 12.5-25 g  12.5-25 g IntraVENous PRN    glucagon (GLUCAGEN) injection 1 mg  1 mg IntraMUSCular PRN    predniSONE (DELTASONE) tablet 7.5 mg  7.5 mg Oral DAILY    tacrolimus (PROGRAF) capsule 3 mg  3 mg Oral BID    [START ON 3/10/2017] trimethoprim-sulfamethoxazole (BACTRIM DS, SEPTRA DS) 160-800 mg per tablet 1 Tab  1 Tab Oral EVERY MON & FRI    [START ON 3/8/2017] aspirin delayed-release tablet 81 mg  81 mg Oral DAILY    [START ON 3/8/2017] atorvastatin (LIPITOR) tablet 40 mg  40 mg Oral DAILY    [START ON 3/8/2017] insulin lispro (HUMALOG) injection 22 Units  22 Units SubCUTAneous ACB    [START ON 3/8/2017] insulin lispro (HUMALOG) injection 20 Units  20 Units SubCUTAneous ACL    [START ON 3/8/2017] insulin lispro (HUMALOG) injection 16 Units  16 Units SubCUTAneous ACD    [START ON 3/8/2017] insulin NPH (NOVOLIN N, HUMULIN N) injection 22 Units  22 Units SubCUTAneous DAILY    insulin NPH (NOVOLIN N, HUMULIN N) injection 24 Units  24 Units SubCUTAneous QHS    [START ON 3/8/2017] sertraline (ZOLOFT) tablet 50 mg  50 mg Oral DAILY            Lab Review: Recent Labs      03/07/17   0050   WBC  8.9   HGB  12.7   HCT  37.9   PLT  137*     Recent Labs      03/07/17   0050   NA  140   K  3.7   CL  107   CO2  19*   GLU  226*   BUN  22*   CREA  1.79*   CA  8.8   ALB  3.1*   SGOT  23   ALT  48     No components found for: [place]         Assessment / Plan:     Acute gastroenteritis (3/7/2017)/ Nausea and vomiting (5/31/2013): suspect a viral etiology. Diarrhea is improving, no need for abx at this point. Continue supportive care with IVF     CKD (chronic kidney disease) stage 3, GFR 30-59 ml/min (5/31/2013)/ S/p cadaver renal transplant (5/31/2013): resume Prograft, Cellcept, Prednisone and prophylactic bactrim. Consult renal     HTN (hypertension), benign (5/31/2013): BP well controlled. Hold Amlodipine, Atenolol     DM type 2 causing CKD stage 3 ([facility] [address] (3/7/2017): BG elevated. Pt eating.  Resume NPH at home dose and insulin with meals     Gastroesophageal reflux disease without esophagitis (3/7/2017): resume Ranitidine     Hyperlipidemia (3/7/2017): resume Simvastatin     Depression (3/7/2017): resume Zoloft      Total time spent with patient: 39 [address] discussed with: Patient and Nursing Staff    Discussed:  Care Plan    Prophylaxis:  Lovenox    Disposition:  Home w/Family           ___________________________________________________    Attending Physician: Vahid Vargas MD

## 2017-03-07 NOTE — PROGRESS NOTES
10:02 AM Patient placed on enteric isolation for diarrhea, c diff order placed to rule out, if patient does not have diarrhea in 24 hours, c. Diff test may be canceled per nurse driven protocol, but patient will remain on isolation for gastroenteritis. 12:30PM Spoke with Dr. Hiwot Easton regarding need for c.diff test, MD states to page MD if patient has loose stool for MD to place order.

## 2017-03-07 NOTE — PROGRESS NOTES
4:07 PM , called Dr. Felizardo Peabody, MD states to give 15unit of SSI. Will continue to monitor patient.

## 2017-03-07 NOTE — H&P
[address]  [phone]    Admission History and Physical      NAME:              [place]   :   1960   MRN:  [id number]     PCP:  Oracio Caal NP     Date:     3/7/2017     Chief  Complaint: Nausea, vomiting, diarrhea x 4 days    History Of Presenting Illness:       Ms. Wyatt Prince is a 60 y.o. female who is being admitted for acute GE. Ms. Wyatt Prince presented to our Emergency Department today complaining of nausea, vomiting as well as watery diarrhea. Associated with a cramping abdominal discomfort. She also had a low grade fever of 100.2 to 100.4. Symptoms have persistent and she was seen by her PCP and started on Amoxil. She is immunosuppressed and due to her persistence of symptoms, she will be admitted for close monitoring and treatment. At the time of admission, she denies any abdominal discomfort and feels better with hydration. Allergies   Allergen Reactions    Nexium [Esomeprazole Magnesium] Unknown (comments)     Patient thinks that she is allergic to nexium. States \"purple pill\"     Zosyn [Piperacillin-Tazobactam] Rash       Prior to Admission medications    Medication Sig Start Date End Date Taking? Authorizing Provider   amoxicillin 500 mg tab Take 500 mg by mouth three (3) times daily. 10/17/15   [place], [place]   insulin (HUMULIN 70/30) 100 unit/mL (70-30) injection 30 units before dinner 6/3/13   Helio Griffiths MD   insulin nph-regular human rec (HUMULIN 70/30 PEN) 100 unit/mL (70-30) flexpen 35 units before breakfast 6/3/13   Helio Griffiths MD   tacrolimus (PROGRAF) 1 mg capsule Take 3 mg by mouth two (2) times a day. Historical Provider   predniSONE (DELTASONE) 5 mg tablet Take 7.5 mg by mouth daily. Historical Provider   mycophenolate (CELLCEPT) 250 mg capsule Take 500 mg by mouth two (2) times a day.     Historical Provider   trimethoprim-sulfamethoxazole (BACTRIM DS) 160-800 mg per tablet Take 1 Tab by mouth every Monday and Friday. In the morning    Historical Provider   trimethoprim-sulfamethoxazole (BACTRIM DS) 160-800 mg per tablet Take 1 Tab by mouth every Monday and Friday. In the evening    Historical Provider   amLODIPine (NORVASC) 10 mg tablet Take 10 mg by mouth daily. Historical Provider   simvastatin (ZOCOR) 40 mg tablet Take 40 mg by mouth nightly. Historical Provider   sertraline (ZOLOFT) 50 mg tablet Take 50 mg by mouth daily. Historical Provider   IRON POLYSACCHARIDES COMPLEX (FERREX 150 PO) Take 1 Cap by mouth daily. Historical Provider   aspirin delayed-release 81 mg tablet Take 81 mg by mouth daily. Historical Provider   ranitidine (ZANTAC) 150 mg tablet Take 150 mg by mouth two (2) times a day. Historical Provider   potassium chloride (K-DUR, KLOR-CON) 20 mEq tablet Take 20 mEq by mouth daily. Historical Provider   atenolol (TENORMIN) 50 mg tablet Take 50 mg by mouth daily. Historical Provider   CALCIUM CARBONATE/VITAMIN D3 (CALCIUM + D PO) Take 1 Tab by mouth two (2) times a day. (Calcium Carbonate 1200 mg with 800 units Vitamin D)    Historical Provider       Past Medical History:   Diagnosis Date    CKD (chronic kidney disease) stage 3, GFR 30-59 ml/min 5/31/2013    Diabetes mellitus ([facility] [address]     HTN (hypertension)     S/p cadaver renal transplant     Spleen enlarged         Past Surgical History:   Procedure Laterality Date    HX RENAL TRANSPLANT  1997       Social History   Substance Use Topics    Smoking status: Never Smoker    Smokeless tobacco: Not on file    Alcohol use No        Family History   Problem Relation Age of Onset    Diabetes Other     Hypertension Other         Review of Systems:    Constitutional ROS: subjective fever, chills but no rigors or night sweats  Respiratory ROS: no cough, sputum, hemoptysis, dyspnea or pleuritic pain.   Cardiovascular ROS: no chest pain, palpitations, orthopnea, PND or syncope  Endocrine ROS: no polydispsia, polyuria, heat or cold intolerance or major weight change. Gastrointestinal ROS: no dysphagia or any bleeding   Genito-Urinary ROS: no dysuria, frequency, hematuria, retention or flank pain  Musculoskeletal ROS: no joint pain, swelling or muscular tenderness  Neurological ROS: no headache, confusion, focal weakness or any other neurological symptoms  Psychiatric ROS: no depression, anxiety, mood swings  Dermatological ROS: no rash, pruritis, or urticaria    Examination:    Vital signs:    Visit Vitals    /74    Pulse (!) 113    Temp 99.7 °F (37.6 °C)    Resp 20    Ht 5' 6\" (1.676 m)    Wt 92.1 kg (203 lb)    SpO2 94%    BMI 32.77 kg/m2         Physical Examination:    General:  Weak and ill looking patient in no acute distress  Eyes: Pink conjunctivae, PERRLA with no discharge.   Ear, Nose & Throat: No ottorrhea, rhinorrhea and has dry mucous membranes  Respiratory:  No accessory muscle use, clear breath sounds without crackles or wheezes  Cardiovascular:  No JVD or murmurs, sinus tachycardia, without thrills, bruits or peripheral edema  GI & :  Soft abdomen, non-tender, non-distended, normoactive bowel sounds with no palpable organomegaly  Musculoskeletal:  No cyanosis, clubbing, atrophy or deformities  Skin:  No rashes, bruising or ulcers   Neurological: Awake and alert, speech is clear, CNs 2-12 are grossly intact and otherwise non focal  Psychiatric:  Has a fair insight and is oriented x 3  ________________________________________________________________________    Data Review:    Labs:    Recent Labs      03/07/17   0050   WBC  8.9   HGB  12.7   HCT  37.9   PLT  137*     Recent Labs      03/07/17   0050   NA  140   K  3.7   CL  107   CO2  19*   GLU  226*   BUN  22*   CREA  1.79*   CA  8.8   ALB  3.1*   SGOT  23   ALT  48     No components found for: GLPOC  No results for input(s): PH, PCO2, PO2, HCO3, FIO2 in the last 72 hours. No results for input(s): INR in the last 72 hours. No lab exists for component: INREXT    Radiological Studies:      Chest X-ray: Normal.    Other Medical tests:    Personally reviewed EKG: Normal rate, rhythm, axis and intervals. and No acute changes suggestive of ischemia    I have reviewed old medical records available. Assessment & Impression:     Ms. Aleksandra Elder is a 60 y.o. female being evaluated for:     Active Problems:    Nausea and vomiting (5/31/2013)      S/p cadaver renal transplant (5/31/2013)      HTN (hypertension), benign (5/31/2013)      CKD (chronic kidney disease) stage 3, GFR 30-59 ml/min (5/31/2013)      Acute gastroenteritis (3/7/2017)      DM type 2 causing CKD stage 3 ([address] (3/7/2017)      Gastroesophageal reflux disease without esophagitis (3/7/2017)      Hyperlipidemia (3/7/2017)      Depression (3/7/2017)         Plan of management:    Acute gastroenteritis (3/7/2017)/ Nausea and vomiting (5/31/2013): suspect a viral etiology. Symptoms still persist and she risks volume depletion. Lactate and WBCs normal. Afebrile. Admit to hospital. IVF hydration. Check stools cultures if this persists. Monitor clinically. CKD (chronic kidney disease) stage 3, GFR 30-59 ml/min (5/31/2013)/ S/p cadaver renal transplant (5/31/2013): resume Prograft, Cellcept, Prednisone and prophylactic bactrim. Consult renal    HTN (hypertension), benign (5/31/2013): BP well controlled. Resume Amlodipine, Atenolol    DM type 2 causing CKD stage 3 ([address] (3/7/2017): now with nausea and vomiting. Monitor blood glucose. Resume adjusted home insulin.      Gastroesophageal reflux disease without esophagitis (3/7/2017): resume Ranitidine    Hyperlipidemia (3/7/2017): resume Simvastatin    Depression (3/7/2017): resume Zoloft    Code Status:  Full    Surrogate decision maker: Family    Risk of deterioration: high      Total time spent for the care of the patient: [address] discussed with: Patient, Nursing Staff and ED physician    Discussed:  Code Status, Care Plan and D/C Planning    Prophylaxis:  Hep SQ    Probable Disposition:  Home w/Family           ___________________________________________________    Attending Physician: Alli Guerrero MD

## 2017-03-07 NOTE — PROGRESS NOTES
9:30 AM TRANSFER - IN REPORT:    Verbal report received from [place], RN(name) on [address]  being received from ED(unit) for routine progression of care      Report consisted of patients Situation, Background, Assessment and   Recommendations(SBAR). Information from the following report(s) SBAR and Kardex was reviewed with the receiving nurse. Opportunity for questions and clarification was provided. Assessment completed upon patients arrival to unit and care assumed.    Primary Nurse Jeffrey Diaz and Radha aNils RN performed a dual skin assessment on this patient No impairment noted  Dewayne score is 22

## 2017-03-07 NOTE — ED NOTES
Assumed care of pt. Pt resting in stretcher in low/locked position and call bell within reach. Introduced self as primary nurse using AIDET, discussed plan of care with pt. Opportunity given for pt to ask questions, pt advised that medical information will be discussed and it is their own responsibility to let nurse know if conversation should not take place in front of visitors. Pt verbalizes understanding. Pt denies any additional complaints at this time.

## 2017-03-07 NOTE — ED PROVIDER NOTES
Patient is a 60 y.o. female presenting with nausea, vomiting, and diarrhea. Nausea    The problem has been gradually worsening. Associated symptoms include a fever and diarrhea. Pertinent negatives include no headaches, no cough and no headaches. Vomiting    Associated symptoms include a fever and diarrhea. Pertinent negatives include no headaches, no cough and no headaches. Diarrhea    Associated symptoms include a fever, diarrhea, nausea and vomiting. Pertinent negatives include no dysuria, no headaches and no chest pain. 60year old female with CKD, s/p transplant, dm, htn, presents with fever, nausea, vomiting,diarrhea, myalgia's since 3/4. Has not been able to eat anything, is drinking fluids and making urine. No dysuria. Fever 100.7 today and she felt her heart racing. Feels some sob tonight with heart racing. Denies chest pain or cough/URI symptoms. Denies sore throat. Had some abdominal cramping early on in illness but none now. No sick contacts. NO recent travel or known bad food/water exposure. Followed at [address] [place] [address] for renal transplant which she states she had had rejection. Not on dialysis. Last tylenol Monday morning. Went to her PCP today who prescribed Azithromycin. Patient isn't sure why. Past Medical History:   Diagnosis Date    CKD (chronic kidney disease) stage 3, GFR 30-59 ml/min 5/31/2013    Diabetes mellitus ([facility] [address]     HTN (hypertension)     S/p cadaver renal transplant     Spleen enlarged        Past Surgical History:   Procedure Laterality Date    HX RENAL TRANSPLANT  1997         Family History:   Problem Relation Age of Onset    Diabetes Other     Hypertension Other        Social History     Social History    Marital status:      Spouse name: N/A    Number of children: N/A    Years of education: N/A     Occupational History    Not on file.      Social History Main Topics    Smoking status: Never Smoker    Smokeless tobacco: Not on file    Alcohol use No    Drug use: Not on file    Sexual activity: Not on file     Other Topics Concern    Not on file     Social History Narrative         ALLERGIES: Nexium [esomeprazole magnesium] and Zosyn [piperacillin-tazobactam]    Review of Systems   Constitutional: Positive for appetite change and fever. HENT: Negative for congestion. Eyes: Negative for visual disturbance. Respiratory: Positive for shortness of breath. Negative for cough and chest tightness. Cardiovascular: Negative for chest pain. Gastrointestinal: Positive for diarrhea, nausea and vomiting. Negative for blood in stool. Endocrine: Negative for polyuria. Genitourinary: Negative for dysuria. Musculoskeletal: Negative for gait problem. Neurological: Positive for weakness (generalized). Negative for headaches. Psychiatric/Behavioral: Negative for dysphoric mood. Vitals:    03/07/17 0040 03/07/17 0041   BP: 140/87 140/87   Pulse:  (!) 113   Resp:  20   Temp:  99.7 °F (37.6 °C)   SpO2: 96% 97%   Weight:  92.1 kg (203 lb)   Height:  5' 6\" (1.676 m)            Physical Exam   Constitutional: She is oriented to person, place, and time. She appears well-developed and well-nourished. No distress. HENT:   Head: Normocephalic and atraumatic. Mouth/Throat: Oropharynx is clear and moist. No oropharyngeal exudate. muscous membranes appear dry   Eyes: Conjunctivae and EOM are normal. Pupils are equal, round, and reactive to light. Right eye exhibits no discharge. Left eye exhibits no discharge. No scleral icterus. Neck: Normal range of motion. Neck supple. No JVD present. Cardiovascular: Regular rhythm, normal heart sounds and intact distal pulses. Tachycardia present. Exam reveals no gallop and no friction rub. No murmur heard. Pulmonary/Chest: Effort normal and breath sounds normal. No stridor. No respiratory distress. She has no wheezes. She has no rales. She exhibits no tenderness. Abdominal: Soft.  Bowel sounds are normal. She exhibits no distension and no mass. There is no tenderness. There is no rebound and no guarding. Musculoskeletal: Normal range of motion. She exhibits no edema or tenderness. Neurological: She is alert and oriented to person, place, and time. She has normal reflexes. No cranial nerve deficit. She exhibits normal muscle tone. Coordination normal.   Skin: Skin is warm and dry. No rash noted. No erythema. Psychiatric: She has a normal mood and affect. Her behavior is normal. Judgment and thought content normal.        MDM  ED Course       Procedures      Tylenol/zofran/fluids, labs including lactate, cxr and reassess. OBtain recent labs/discharge summary from [facility]. WBC/lactate WNL, reassuring exam.  Bun/cr slightly up with bicarb of 19 with 3 days of vomiting and diarrhea. Given renal transplant patient on immunosuppressants, will admit for IV hydration over night. Spoke with hospitalist who will come to evaluate for admission.

## 2017-03-07 NOTE — PROGRESS NOTES
Interdisciplinary team rounds were held 3/7/2017 with the following team members:Care Management, Hospice, Nutrition, Palliative Care and Physical Therapy and the primary RN. Plan of care discussed. See clinical pathway and/or care plan for interventions and desired outcomes.     Discharge 1-2 days

## 2017-03-07 NOTE — ED NOTES
Bedside and Verbal shift change report given to Gurpreet Gan (oncoming nurse) by Ana M Monroy RN (offgoing nurse). Report included the following information SBAR, ED Summary, MAR and Recent Results.

## 2017-03-07 NOTE — ED NOTES
Patient ambulatory to restroom at this time to obtain urine specimen with RN assistance; steady gait.

## 2017-03-07 NOTE — PROGRESS NOTES
[facility]: MED RECONCILIATION    Comments/Recommendations:    Patient is alert and oriented   Allergies were verified with patient   Patient is aware of what to do in case of low blood sugar (has glucose tabs) and checks BS before each meal (reports numbers ranging from 120-250)   Patient no longer taking mycophenolate as anti-rejection medication for kidney transplant back in 1997   Patient reports not taking tacrolimus in the past 3-4 days due to feeling sick; counseled on importance of adherence of medication to prevent kidney rejection   Currently takes Bactrim DS twice weekly on Monday and Friday    Medications added:     · Insulin lispro three times daily before meals  · Albuterol inhaler  · Vitamin D3 2000 U  · Atorvastatin 40mg    Medications removed:    · Calcium carbonate/vitamin D3 1200mg/800U (1 tablet twice daily)  · Insulin  unit/mL flexpen (35 units before breakfast)  · Mycophenolate 250mg capsule (2 capsules twice daily)  · Simvastatin 40mg nightly  · Bactrim DS one tablet every Monday and Friday in the evening    Medications adjusted:    · Insulin NPH injection changed from 30 units before dinner to 22 units in AM and 24 units in PM  · Potassium 20mEq changed from once daily to twice daily    Information obtained from: patient, Rx Query    Significant PMH/Disease States:   Past Medical History:   Diagnosis Date    CKD (chronic kidney disease) stage 3, GFR 30-59 ml/min 5/31/2013    Diabetes mellitus ([facility]     HTN (hypertension)     S/p cadaver renal transplant     Spleen enlarged      Chief Complaint for this Admission:   Chief Complaint   Patient presents with    Nausea    Vomiting    Diarrhea     Allergies: Doxycycline and Nexium [esomeprazole magnesium]    Prior to Admission Medications:   Prior to Admission Medications   Prescriptions Last Dose Informant Patient Reported? Taking?    Cholecalciferol, Vitamin D3, (VITAMIN D3) 2,000 unit cap capsule 3/6/2017 at am Self Yes Yes   Sig: Take 2,000 Units by mouth daily. IRON POLYSACCHARIDES COMPLEX (FERREX 150 PO) 3/6/2017 at am Self Yes Yes   Sig: Take 1 Cap by mouth daily. albuterol (PROVENTIL HFA, VENTOLIN HFA, PROAIR HFA) 90 mcg/actuation inhaler 3/7/2017 at am Self Yes Yes   Sig: Take 2 Puffs by inhalation every four (4) hours as needed for Wheezing. amLODIPine (NORVASC) 10 mg tablet 3/6/2017 at am Self Yes Yes   Sig: Take 10 mg by mouth daily. aspirin delayed-release 81 mg tablet 3/6/2017 at am Self Yes Yes   Sig: Take 81 mg by mouth daily. atenolol (TENORMIN) 50 mg tablet 3/6/2017 at am Self Yes Yes   Sig: Take 50 mg by mouth daily. atorvastatin (LIPITOR) 40 mg tablet 3/6/2017 at am Self Yes Yes   Sig: Take 40 mg by mouth daily. insulin NPH (NOVOLIN N, HUMULIN N) 100 unit/mL injection 3/6/2017 at am Self Yes Yes   Si Units by SubCUTAneous route daily. insulin NPH (NOVOLIN N, HUMULIN N) 100 unit/mL injection  Self Yes Yes   Si Units by SubCUTAneous route every evening. insulin lispro (HUMALOG) 100 unit/mL injection 3/6/2017 at 0900 Self Yes Yes   Si Units by SubCUTAneous route Daily (before breakfast). insulin lispro (HUMALOG) 100 unit/mL injection 3/6/2017 at 1300 Self Yes Yes   Si Units by SubCUTAneous route Daily (before lunch). insulin lispro (HUMALOG) 100 unit/mL injection 3/6/2017 at 1800 Self Yes Yes   Si Units by SubCUTAneous route Daily (before dinner). potassium chloride (K-DUR, KLOR-CON) 20 mEq tablet 3/6/2017 at pm Self Yes Yes   Sig: Take 20 mEq by mouth two (2) times a day. predniSONE (DELTASONE) 5 mg tablet 3/6/2017 at am Self Yes Yes   Sig: Take 7.5 mg by mouth daily. ranitidine (ZANTAC) 150 mg tablet 3/6/2017 at pm Self Yes Yes   Sig: Take 150 mg by mouth two (2) times a day. sertraline (ZOLOFT) 50 mg tablet 3/6/2017 at am Self Yes Yes   Sig: Take 50 mg by mouth daily.    tacrolimus (PROGRAF) 1 mg capsule 3/4/2017 at am Self Yes Yes   Sig: Take 3 mg by mouth two (2) times a day.   trimethoprim-sulfamethoxazole (BACTRIM DS) 160-800 mg per tablet 3/6/2017 at am Self Yes Yes   Sig: Take 1 Tab by mouth every Monday and Friday.  In the morning      Facility-Administered Medications: None     Thank you,  [employer]  Class of 2017

## 2017-03-08 VITALS
DIASTOLIC BLOOD PRESSURE: 75 MMHG | BODY MASS INDEX: 32.62 KG/M2 | OXYGEN SATURATION: 95 % | TEMPERATURE: 98.7 F | HEIGHT: 66 IN | RESPIRATION RATE: 18 BRPM | HEART RATE: 77 BPM | WEIGHT: 203 LBS | SYSTOLIC BLOOD PRESSURE: 114 MMHG

## 2017-03-08 LAB
ALBUMIN SERPL BCP-MCNC: 2.7 G/DL (ref 3.5–5)
ALBUMIN/GLOB SERPL: 0.8 {RATIO} (ref 1.1–2.2)
ALP SERPL-CCNC: 75 U/L (ref 45–117)
ALT SERPL-CCNC: 44 U/L (ref 12–78)
ANION GAP BLD CALC-SCNC: 9 MMOL/L (ref 5–15)
AST SERPL W P-5'-P-CCNC: 21 U/L (ref 15–37)
BACTERIA SPEC CULT: NORMAL
BASOPHILS # BLD AUTO: 0 K/UL (ref 0–0.1)
BASOPHILS # BLD: 0 % (ref 0–1)
BILIRUB SERPL-MCNC: 0.3 MG/DL (ref 0.2–1)
BUN SERPL-MCNC: 14 MG/DL (ref 6–20)
BUN/CREAT SERPL: 10 (ref 12–20)
CALCIUM SERPL-MCNC: 8.2 MG/DL (ref 8.5–10.1)
CC UR VC: NORMAL
CHLORIDE SERPL-SCNC: 112 MMOL/L (ref 97–108)
CO2 SERPL-SCNC: 21 MMOL/L (ref 21–32)
CREAT SERPL-MCNC: 1.46 MG/DL (ref 0.55–1.02)
EOSINOPHIL # BLD: 0.2 K/UL (ref 0–0.4)
EOSINOPHIL NFR BLD: 4 % (ref 0–7)
ERYTHROCYTE [DISTWIDTH] IN BLOOD BY AUTOMATED COUNT: 13.4 % (ref 11.5–14.5)
GLOBULIN SER CALC-MCNC: 3.4 G/DL (ref 2–4)
GLUCOSE BLD STRIP.AUTO-MCNC: 237 MG/DL (ref 65–100)
GLUCOSE BLD STRIP.AUTO-MCNC: 245 MG/DL (ref 65–100)
GLUCOSE SERPL-MCNC: 239 MG/DL (ref 65–100)
HCT VFR BLD AUTO: 33.6 % (ref 35–47)
HGB BLD-MCNC: 10.6 G/DL (ref 11.5–16)
LYMPHOCYTES # BLD AUTO: 43 % (ref 12–49)
LYMPHOCYTES # BLD: 2 K/UL (ref 0.8–3.5)
MCH RBC QN AUTO: 28.5 PG (ref 26–34)
MCHC RBC AUTO-ENTMCNC: 31.5 G/DL (ref 30–36.5)
MCV RBC AUTO: 90.3 FL (ref 80–99)
MONOCYTES # BLD: 0.7 K/UL (ref 0–1)
MONOCYTES NFR BLD AUTO: 14 % (ref 5–13)
NEUTS SEG # BLD: 1.8 K/UL (ref 1.8–8)
NEUTS SEG NFR BLD AUTO: 39 % (ref 32–75)
PLATELET # BLD AUTO: 100 K/UL (ref 150–400)
POTASSIUM SERPL-SCNC: 3.6 MMOL/L (ref 3.5–5.1)
PROT SERPL-MCNC: 6.1 G/DL (ref 6.4–8.2)
RBC # BLD AUTO: 3.72 M/UL (ref 3.8–5.2)
SERVICE CMNT-IMP: ABNORMAL
SERVICE CMNT-IMP: ABNORMAL
SERVICE CMNT-IMP: NORMAL
SODIUM SERPL-SCNC: 142 MMOL/L (ref 136–145)
WBC # BLD AUTO: 4.7 K/UL (ref 3.6–11)

## 2017-03-08 PROCEDURE — 99218 HC RM OBSERVATION: CPT

## 2017-03-08 PROCEDURE — 96372 THER/PROPH/DIAG INJ SC/IM: CPT

## 2017-03-08 PROCEDURE — 74011250636 HC RX REV CODE- 250/636: Performed by: INTERNAL MEDICINE

## 2017-03-08 PROCEDURE — 80053 COMPREHEN METABOLIC PANEL: CPT | Performed by: INTERNAL MEDICINE

## 2017-03-08 PROCEDURE — 74011636637 HC RX REV CODE- 636/637: Performed by: INTERNAL MEDICINE

## 2017-03-08 PROCEDURE — 85025 COMPLETE CBC W/AUTO DIFF WBC: CPT | Performed by: INTERNAL MEDICINE

## 2017-03-08 PROCEDURE — 80197 ASSAY OF TACROLIMUS: CPT | Performed by: INTERNAL MEDICINE

## 2017-03-08 PROCEDURE — 74011250637 HC RX REV CODE- 250/637: Performed by: INTERNAL MEDICINE

## 2017-03-08 PROCEDURE — 82962 GLUCOSE BLOOD TEST: CPT

## 2017-03-08 PROCEDURE — 36415 COLL VENOUS BLD VENIPUNCTURE: CPT | Performed by: INTERNAL MEDICINE

## 2017-03-08 RX ADMIN — INSULIN LISPRO 22 UNITS: 100 INJECTION, SOLUTION INTRAVENOUS; SUBCUTANEOUS at 09:12

## 2017-03-08 RX ADMIN — TACROLIMUS 3 MG: 1 CAPSULE ORAL at 11:11

## 2017-03-08 RX ADMIN — PREDNISONE 7.5 MG: 5 TABLET ORAL at 09:11

## 2017-03-08 RX ADMIN — INSULIN LISPRO 20 UNITS: 100 INJECTION, SOLUTION INTRAVENOUS; SUBCUTANEOUS at 11:11

## 2017-03-08 RX ADMIN — INSULIN LISPRO 3 UNITS: 100 INJECTION, SOLUTION INTRAVENOUS; SUBCUTANEOUS at 11:11

## 2017-03-08 RX ADMIN — ASPIRIN 81 MG: 81 TABLET, COATED ORAL at 09:11

## 2017-03-08 RX ADMIN — INSULIN LISPRO 3 UNITS: 100 INJECTION, SOLUTION INTRAVENOUS; SUBCUTANEOUS at 09:12

## 2017-03-08 RX ADMIN — HUMAN INSULIN 22 UNITS: 100 INJECTION, SUSPENSION SUBCUTANEOUS at 09:00

## 2017-03-08 RX ADMIN — HEPARIN SODIUM 5000 UNITS: 5000 INJECTION, SOLUTION INTRAVENOUS; SUBCUTANEOUS at 11:11

## 2017-03-08 RX ADMIN — HEPARIN SODIUM 5000 UNITS: 5000 INJECTION, SOLUTION INTRAVENOUS; SUBCUTANEOUS at 02:34

## 2017-03-08 RX ADMIN — Medication 10 ML: at 02:34

## 2017-03-08 NOTE — DISCHARGE SUMMARY
Physician Discharge Summary     Patient ID:  [place]  [id number]  60 y.o.  1960    Admit date: 3/7/2017    Discharge date and time: 3/8/2017    Admission Diagnoses: Acute gastroenteritis    Discharge Diagnoses: Active Problems:    Nausea and vomiting (5/31/2013)      S/p cadaver renal transplant (5/31/2013)      HTN (hypertension), benign (5/31/2013)      CKD (chronic kidney disease) stage 3, GFR 30-59 ml/min (5/31/2013)      Acute gastroenteritis (3/7/2017)      DM type 2 causing CKD [facility] ([facility] (3/7/2017)      Gastroesophageal reflux disease without esophagitis (3/7/2017)      Hyperlipidemia (3/7/2017)      Depression (3/7/2017)           Hospital Course:   Acute gastroenteritis / Nausea and vomiting: suspect a viral etiology. Diarrhea is resolved, no need for abx at this point. Continue supportive care with IVF     CKD (chronic kidney disease) stage 3, GFR 30-59 ml/min (5/31/2013)/ S/p cadaver renal transplant (5/31/2013): resume Prograft, Cellcept, Prednisone and prophylactic bactrim. Consult renal     HTN (hypertension), benign (5/31/2013): BP well controlled. Hold Amlodipine, Atenolol     DM type 2 causing CKD stage 3 ([facility] (3/7/2017): BG elevated. Pt eating. Resume NPH at home dose and insulin with meals. Discussed poor glucose control and its effects on pts with renal disease, she will follow up with her outpt physician for titration of insulin     Gastroesophageal reflux disease without esophagitis (3/7/2017): resume Ranitidine     Hyperlipidemia (3/7/2017): resume Simvastatin     Depression (3/7/2017): resume Zoloft      PCP: Humberto Landers NP     Consults: Nephrology    Condition of patient at discharge: improved    Discharge Exam:  Please refer to progress note from today.     Disposition: home    Patient Instructions:   Current Discharge Medication List      CONTINUE these medications which have NOT CHANGED    Details   !! insulin NPH (NOVOLIN N, HUMULIN N) 100 unit/mL injection 22 Units by SubCUTAneous route daily. !! insulin lispro (HUMALOG) 100 unit/mL injection 22 Units by SubCUTAneous route Daily (before breakfast). !! insulin lispro (HUMALOG) 100 unit/mL injection 20 Units by SubCUTAneous route Daily (before lunch). !! insulin lispro (HUMALOG) 100 unit/mL injection 16 Units by SubCUTAneous route Daily (before dinner). albuterol (PROVENTIL HFA, VENTOLIN HFA, PROAIR HFA) 90 mcg/actuation inhaler Take 2 Puffs by inhalation every four (4) hours as needed for Wheezing. Cholecalciferol, Vitamin D3, (VITAMIN D3) 2,000 unit cap capsule Take 2,000 Units by mouth daily. atorvastatin (LIPITOR) 40 mg tablet Take 40 mg by mouth daily. !! insulin NPH (NOVOLIN N, HUMULIN N) 100 unit/mL injection 24 Units by SubCUTAneous route nightly. tacrolimus (PROGRAF) 1 mg capsule Take 3 mg by mouth two (2) times a day. predniSONE (DELTASONE) 5 mg tablet Take 7.5 mg by mouth daily. trimethoprim-sulfamethoxazole (BACTRIM DS) 160-800 mg per tablet Take 1 Tab by mouth every Monday and Friday. In the morning      amLODIPine (NORVASC) 10 mg tablet Take 10 mg by mouth daily. sertraline (ZOLOFT) 50 mg tablet Take 50 mg by mouth daily. IRON POLYSACCHARIDES COMPLEX (FERREX 150 PO) Take 1 Cap by mouth daily. aspirin delayed-release 81 mg tablet Take 81 mg by mouth daily. ranitidine (ZANTAC) 150 mg tablet Take 150 mg by mouth two (2) times a day. potassium chloride (K-DUR, KLOR-CON) 20 mEq tablet Take 20 mEq by mouth two (2) times a day. atenolol (TENORMIN) 50 mg tablet Take 50 mg by mouth daily. !! - Potential duplicate medications found. Please discuss with provider. Activity: Activity as tolerated  Diet: Resume previous diet  Wound Care: None needed    Follow-up with Phyllis Thompson NP in 1 week.   Follow-up tests/labs none    Approximate time spent in patient care on day of discharge: 32 minutes    Signed:  Edwardo Nageotte, MD  3/8/2017  12:35 PM

## 2017-03-08 NOTE — PROGRESS NOTES
[address] Sif[address] [place] [address]  [address] [facility], [address] [place] [address]  [phone]      Medical Progress Note      NAME: Ohio   :  1960  MRM:  [id number]    Date/Time: 3/8/2017           Subjective:     Chief Complaint:  I feel better    Pt reports improvement of her diarrhea. Wishes to go home          Objective:       Vitals:          Last 24hrs VS reviewed since prior progress note.  Most recent are:    Visit Vitals    /75 (BP 1 Location: Right arm, BP Patient Position: At rest)    Pulse 77    Temp 98.7 °F (37.1 °C)    Resp 18    Ht 5' 6\" (1.676 m)    Wt 92.1 kg (203 lb)    SpO2 95%    BMI 32.77 kg/m2     SpO2 Readings from Last 6 Encounters:   17 95%   10/17/15 97%   10/16/13 98%   13 95%            Intake/Output Summary (Last 24 hours) at 17 1236  Last data filed at 17 1651   Gross per 24 hour   Intake                0 ml   Output              400 ml   Net             -400 ml          Exam:     Physical Exam:    Gen:  Well-developed, well-nourished, in no acute distress  HEENT:  Pink conjunctivae, PERRL, hearing intact to voice, moist mucous membranes  Neck:  Supple, without masses, thyroid non-tender  Resp:  No accessory muscle use, clear breath sounds without wheezes rales or rhonchi  Card:  No murmurs, normal S1, S2 without thrills, bruits or peripheral edema  Abd:  Soft, non-tender, non-distended, normoactive bowel sounds are present  Musc:  No cyanosis or clubbing  Skin:  No rashes or ulcers, skin turgor is good  Neuro:  Cranial nerves 3-12 are grossly intact,  strength is 5/5 bilaterally and dorsi / plantarflexion is 5/5 bilaterally, follows commands appropriately  Psych:  Good insight, oriented to person, place and time, alert       Medications Reviewed: (see below)    Lab Data Reviewed: (see below)    ______________________________________________________________________    Medications:     Current Facility-Administered Medications   Medication Dose Route Frequency    sodium chloride (NS) flush 5-10 mL  5-10 mL IntraVENous Q8H    sodium chloride (NS) flush 5-10 mL  5-10 mL IntraVENous PRN    acetaminophen (TYLENOL) tablet 650 mg  650 mg Oral Q4H PRN    morphine injection 1 mg  1 mg IntraVENous Q4H PRN    naloxone (NARCAN) injection 0.4 mg  0.4 mg IntraVENous PRN    diphenhydrAMINE (BENADRYL) injection 12.5 mg  12.5 mg IntraVENous Q4H PRN    prochlorperazine (COMPAZINE) injection 5 mg  5 mg IntraVENous Q8H PRN    LORazepam (ATIVAN) tablet 1 mg  1 mg Oral BID PRN    0.9% sodium chloride infusion  100 mL/hr IntraVENous CONTINUOUS    heparin (porcine) injection 5,000 Units  5,000 Units SubCUTAneous Q8H    insulin lispro (HUMALOG) injection   SubCUTAneous AC&HS    glucose chewable tablet 16 g  4 Tab Oral PRN    dextrose (D50W) injection syrg 12.5-25 g  12.5-25 g IntraVENous PRN    glucagon (GLUCAGEN) injection 1 mg  1 mg IntraMUSCular PRN    predniSONE (DELTASONE) tablet 7.5 mg  7.5 mg Oral DAILY    tacrolimus (PROGRAF) capsule 3 mg  3 mg Oral BID    [START ON 3/10/2017] trimethoprim-sulfamethoxazole (BACTRIM DS, SEPTRA DS) 160-800 mg per tablet 1 Tab  1 Tab Oral EVERY MON & FRI    aspirin delayed-release tablet 81 mg  81 mg Oral DAILY    atorvastatin (LIPITOR) tablet 40 mg  40 mg Oral DAILY    insulin lispro (HUMALOG) injection 22 Units  22 Units SubCUTAneous DAILY WITH BREAKFAST    insulin lispro (HUMALOG) injection 20 Units  20 Units SubCUTAneous DAILY WITH LUNCH    insulin lispro (HUMALOG) injection 16 Units  16 Units SubCUTAneous DAILY WITH DINNER    insulin NPH (NOVOLIN N, HUMULIN N) injection 22 Units  22 Units SubCUTAneous DAILY    insulin NPH (NOVOLIN N, HUMULIN N) injection 24 Units  24 Units SubCUTAneous QHS    sertraline (ZOLOFT) tablet 50 mg  50 mg Oral DAILY            Lab Review:     Recent Labs      03/08/17   0847  03/07/17   0050   WBC  4.7  8.9   HGB  10.6*  12.7   HCT  33.6*  37.9   PLT  100* 137*     Recent Labs      03/08/17   0847  03/07/17   0050   NA  142  140   K  3.6  3.7   CL  112*  107   CO2  21  19*   GLU  239*  226*   BUN  14  22*   CREA  1.46*  1.79*   CA  8.2*  8.8   ALB  2.7*  3.1*   SGOT  21  23   ALT  44  48     No components found for: GLPOC         Assessment / Plan:     Acute gastroenteritis / Nausea and vomiting: suspect a viral etiology. Diarrhea is resolved, no need for abx at this point. Continue supportive care with IVF     CKD (chronic kidney disease) stage 3, GFR 30-59 ml/min (5/31/2013)/ S/p cadaver renal transplant (5/31/2013): resume Prograft, Cellcept, Prednisone and prophylactic bactrim. Consult renal     HTN (hypertension), benign (5/31/2013): BP well controlled. Hold Amlodipine, Atenolol     DM type 2 causing CKD stage 3 ([facility] [address] (3/7/2017): BG elevated. Pt eating. Resume NPH at home dose and insulin with meals.  Discussed poor glucose control and its effects on pts with renal disease, she will follow up with her outpt physician for titration of insulin     Gastroesophageal reflux disease without esophagitis (3/7/2017): resume Ranitidine     Hyperlipidemia (3/7/2017): resume Simvastatin     Depression (3/7/2017): resume Zoloft      Total time spent with patient: [address] discussed with: Patient and Nursing Staff    Discussed:  Care Plan    Prophylaxis:  Lovenox    Disposition:  Home w/Family           ___________________________________________________    Attending Physician: Jori Beasley MD

## 2017-03-08 NOTE — ROUTINE PROCESS
Discharge instructions reviewed withPatient. Patient verbalized understanding of instructions. Opportunities for questions were provided and explained. Patient discharged Home. Discharge medications reviewed with patient and appropriate educational materials and side effects teaching were provided. Patient signed paper copy due to technical error.

## 2017-03-08 NOTE — PROGRESS NOTES
Bedside and Verbal shift change report given to TRACEY Loera (oncoming nurse) by Ilene Eng RN (offgoing nurse). Report included the following information SBAR, Kardex, Procedure Summary, Intake/Output, MAR, Accordion, Recent Results and Med Rec Status.

## 2017-03-08 NOTE — PROGRESS NOTES
[address] [place], [place] [address]valery  YOB: 1960          Assessment & Plan:   1. CADKT 1997  · Cr 1.79 mg ON ARRIVAL ,better now  · CLARIBEL was due to IVVD likley  · On pred 7.5 and FK 3 MG bid  2. Acute Gastroenteritis  · better  3. HTN  · Well controlled  4. DM  5. GERD  6. Dyslipidemia  7.  Depression       Subjective:   CC:CLARIBEL  HPI: Patient seen   CLARIBEL is better  HTN is well controlled  Pred and FK for Transplant  [facility] Transplant yesterday      Current Facility-Administered Medications   Medication Dose Route Frequency    sodium chloride (NS) flush 5-10 mL  5-10 mL IntraVENous Q8H    sodium chloride (NS) flush 5-10 mL  5-10 mL IntraVENous PRN    acetaminophen (TYLENOL) tablet 650 mg  650 mg Oral Q4H PRN    morphine injection 1 mg  1 mg IntraVENous Q4H PRN    naloxone (NARCAN) injection 0.4 mg  0.4 mg IntraVENous PRN    diphenhydrAMINE (BENADRYL) injection 12.5 mg  12.5 mg IntraVENous Q4H PRN    prochlorperazine (COMPAZINE) injection 5 mg  5 mg IntraVENous Q8H PRN    LORazepam (ATIVAN) tablet 1 mg  1 mg Oral BID PRN    0.9% sodium chloride infusion  100 mL/hr IntraVENous CONTINUOUS    heparin (porcine) injection 5,000 Units  5,000 Units SubCUTAneous Q8H    insulin lispro (HUMALOG) injection   SubCUTAneous AC&HS    glucose chewable tablet 16 g  4 Tab Oral PRN    dextrose (D50W) injection syrg 12.5-25 g  12.5-25 g IntraVENous PRN    glucagon (GLUCAGEN) injection 1 mg  1 mg IntraMUSCular PRN    predniSONE (DELTASONE) tablet 7.5 mg  7.5 mg Oral DAILY    tacrolimus (PROGRAF) capsule 3 mg  3 mg Oral BID    [START ON 3/10/2017] trimethoprim-sulfamethoxazole (BACTRIM DS, SEPTRA DS) 160-800 mg per tablet 1 Tab  1 Tab Oral EVERY MON & FRI    aspirin delayed-release tablet 81 mg  81 mg Oral DAILY    atorvastatin (LIPITOR) tablet 40 mg  40 mg Oral DAILY    insulin lispro (HUMALOG) injection 22 Units  22 Units SubCUTAneous DAILY WITH BREAKFAST    insulin lispro (HUMALOG) injection 20 Units  20 Units SubCUTAneous DAILY WITH LUNCH    insulin lispro (HUMALOG) injection 16 Units  16 Units SubCUTAneous DAILY WITH DINNER    insulin NPH (NOVOLIN N, HUMULIN N) injection 22 Units  22 Units SubCUTAneous DAILY    insulin NPH (NOVOLIN N, HUMULIN N) injection 24 Units  24 Units SubCUTAneous QHS    sertraline (ZOLOFT) tablet 50 mg  50 mg Oral DAILY          Objective:     Vitals:  Blood pressure 114/75, pulse 77, temperature 98.7 °F (37.1 °C), resp. rate 18, height 5' 6\" (1.676 m), weight 92.1 kg (203 lb), SpO2 95 %. Temp (24hrs), Av.2 °F (36.8 °C), Min:97.7 °F (36.5 °C), Max:98.7 °F (37.1 °C)      Intake and Output:      1901 -  0700  In: -   Out: 400 [Urine:400]    Physical Exam:                Patient is intubated:  no    Physical Examination:   GENERAL ASSESSMENT: NAD  HEENT:Nontraumatic   CHEST: CTA  HEART: S1S2  ABDOMEN: Soft,NT,  :Ortiz:   EXTREMITY: EDEMA              ECG/rhythm:    Data Review      No results for input(s): TNIPOC in the last 72 hours. No lab exists for component: ITNL   No results for input(s): CPK, CKMB, TROIQ in the last 72 hours. Recent Labs      17   0847  17   0050   NA  142  140   K  3.6  3.7   CL  112*  107   CO2  21  19*   BUN  14  22*   CREA  1.46*  1.79*   GLU  239*  226*   CA  8.2*  8.8   ALB  2.7*  3.1*   WBC  4.7  8.9   HGB  10.6*  12.7   HCT  33.6*  37.9   PLT  100*  137*      No results for input(s): INR, PTP, APTT in the last 72 hours. No lab exists for component: INREXT  Needs: urine analysis, urine sodium, protein and creatinine  Lab Results   Component Value Date/Time    Sodium urine, random 39 2013 02:42 PM         Discussed with:  Colleague    : Garrick Harada, MD  3/8/2017        [facility]:  [email or web address]  [facility] office:  [address] [place] [address], [address] [place] [address],[address]  [place], [address] [facility]  Phone: [phone]  Fax : [phone]    [facility] office:  [address] [facility], [address]  Phone - [phone]  Fax - [phone]

## 2017-03-08 NOTE — PROGRESS NOTES
Bedside and Verbal shift change report given to Shelly Dumont RN (oncoming nurse) by Riddhi Muñoz RN (offgoing nurse). Report included the following information SBAR and Kardex.

## 2017-03-08 NOTE — DISCHARGE INSTRUCTIONS
HOSPITALIST DISCHARGE INSTRUCTIONS  NAME: Kira Jamison   :  1960   MRN:  [id number]     Date/Time:  3/8/2017 12:35 PM    ADMIT DATE: 3/7/2017     DISCHARGE DATE: 3/8/2017     ADMITTING DIAGNOSIS:  Viral gastroenteritis    DISCHARGE DIAGNOSIS:  As above    MEDICATIONS:    · It is important that you take the medication exactly as they are prescribed. · Keep your medication in the bottles provided by the pharmacist and keep a list of the medication names, dosages, and times to be taken in your wallet. · Do not take other medications without consulting your doctor. Pain Management: per above medications    What to do at Home:  1. Check your blood pressure at home twice a day. Call your doctor for blood pressure greater than 150/90 or lower than 110/55 or if you have dizziness or lightheadedness. 2.  Check you blood sugar twice a day. Call your doctor for blood sugar persistently greater than 200 or lower than 80. Recommended diet:  Diabetic Diet    Recommended activity: Activity as tolerated    If you experience any of the following symptoms then please call your primary care physician or return to the emergency room if you cannot get hold of your doctor:  Fever, chills, nausea, vomiting, diarrhea, change in mentation, falling, bleeding, shortness of breath    Follow Up:  Dr. Afshan Son, NP  you are to call and set up an appointment to see them in 1 week. Information obtained by :  I understand that if any problems occur once I am at home I am to contact my physician. I understand and acknowledge receipt of the instructions indicated above.                                                                                                                                            Physician's or R.N.'s Signature                                                                  Date/Time Patient or Representative Signature                                                          Date/Time

## 2017-03-08 NOTE — INTERDISCIPLINARY ROUNDS
Interdisciplinary team rounds were held 3/8/2017 with the following team members:Care Management, Nursing, Nutrition, Physical Therapy and Clinical Coordinator. Plan of care discussed. See clinical pathway and/or care plan for interventions and desired outcomes.     Anticipated discharge: today

## 2017-03-09 LAB — TACROLIMUS, UTACRT: 4 NG/ML (ref 5–15)
